# Patient Record
Sex: FEMALE | Race: WHITE | NOT HISPANIC OR LATINO
[De-identification: names, ages, dates, MRNs, and addresses within clinical notes are randomized per-mention and may not be internally consistent; named-entity substitution may affect disease eponyms.]

---

## 2024-01-08 ENCOUNTER — NON-APPOINTMENT (OUTPATIENT)
Age: 79
End: 2024-01-08

## 2024-01-10 PROBLEM — Z00.00 ENCOUNTER FOR PREVENTIVE HEALTH EXAMINATION: Status: ACTIVE | Noted: 2024-01-10

## 2024-01-12 ENCOUNTER — APPOINTMENT (OUTPATIENT)
Dept: ORTHOPEDIC SURGERY | Facility: CLINIC | Age: 79
End: 2024-01-12
Payer: MEDICARE

## 2024-01-12 ENCOUNTER — NON-APPOINTMENT (OUTPATIENT)
Age: 79
End: 2024-01-12

## 2024-01-12 PROCEDURE — 73502 X-RAY EXAM HIP UNI 2-3 VIEWS: CPT

## 2024-01-12 PROCEDURE — 99204 OFFICE O/P NEW MOD 45 MIN: CPT

## 2024-01-12 NOTE — DISCUSSION/SUMMARY
[de-identified] : 78 year old with RIGHT hip osteoarthritis, with debilitating RIGHT hip pain that is unresponsive to comprehensive conservative management and compromising quality of life. The conditions and treatment options have been discussed with the patient. Given Xrays, failure of prior conservative treatment including physical therapy, NSAIDs, cortisone injections, the patient understands that I recommend total hip replacement as their best option for long term pain relief. At this time, the patient is a candidate for surgery based on xrays and quality of life. The patient participated in an interactive discussion of the THR implant planned for their surgery with questions answered, agreed with the treatment plan, and has decided to move forward with elective THR as planned. Implant brand choices and bearing surfaces were offered to the patient. The patient agreed to the plan of care as well as the use of implants for their total hip replacement.  Patient will be scheduled for RIGHT total hip replacement  Patient requires radiation treatment of the hip joint prior to the surgery to prevent heterotopic ossification  Patient will require a rolling walker as this is needed for activities of daily living within their home secondary to the diagnosis of osteoarthritis and post-surgical ambulation. A 3-in-1 commode for bedside use is also required, as the patient has no ability to access the bathroom in their home.  Dr. Wesley Morin MD

## 2024-01-12 NOTE — ADDENDUM
[FreeTextEntry1] : I, Ciera Trotter, acted solely as a scribe for Dr. Wesley Morin MD on this date 01/12/2024 .  All medical record entries made by the Scribe were at my, Dr. Wesley Morin MD , direction and personally dictated by me on 01/12/2024 . I have reviewed the chart and agree that the record accurately reflects my personal performance of the history, physical exam, assessment and plan. I have also personally directed, reviewed, and agreed with the chart.

## 2024-01-12 NOTE — HISTORY OF PRESENT ILLNESS
[de-identified] : KIKA ANTON is a 78 year F  presenting to the office complaining of right hip pain, history of left total hip replacement 10 years ago. Patient presents ambulating independently. Patient reports pain for years intermittently. Patient complains of chronic right buttock pain that occasionally radiates down the right lower extremity. Pain is provoked by weight bearing. She reports a history of intermittent back pain. She notes an occasional "burning sensation" that radiates down her lower extremities. Denies any groin pain. Denies any trauma or injury. She reports a diminished quality of life due to the pain. She takes Aleve for pain with moderate relief. She states her pain wakes her up throughout the night.

## 2024-01-12 NOTE — PHYSICAL EXAM
[de-identified] : Constitutional: Well nourished , well developed and in no acute distress Psychiatric: Alert and oriented to time place and person.Appropriate affect . Skin: Head, neck, arms and lower extremities:no lesions or discoloration HEENT: Normocephalic, EOM intact, Nasal septum midline, Respiratory: Unlabored respirations,no audible wheezing ,no tachypnea, no cyanosis Cardiovascular: No leg swelling no ankle edema no JVD, pulse regular Vascular: No calf or thigh tenderness, Peripheral pulses: intact Lymphatics: No groin adenopathy,no lymphedema lower or upper extremities   o Right Hip Exam: Inspection/Palpation : no tenderness, no swelling, no deformity Leg Lengths: equal Passive Range of Motion: Log Roll (+ provokes typical pain) Strength: resisted hip flexion 5/5, resisted hip abduction 5/5 Motor Strength: Peroneals, EHL, and tibialis anterior 5/5 Reflexes: Patella Absent R=L, Achilles 2+ R=L Skin : no erythema, no ecchymosis Sensation : sensation to light touch intact Vascular Exam : no edema, no cyanosis, dorsalis pedis artery pulse 2+, posterior tibial artery pulse 2+ [de-identified] : __________________________ X-rays of the AP pelvis and AP, lateral of the RIGHT hip obtained today 01/12/2024 demonstrates supralateral joint space narrowing focal bone on bone opposition and left cementless total hip satisfactory total component alignment

## 2024-02-22 ENCOUNTER — OUTPATIENT (OUTPATIENT)
Dept: OUTPATIENT SERVICES | Facility: HOSPITAL | Age: 79
LOS: 1 days | Discharge: HOME | End: 2024-02-22
Payer: MEDICARE

## 2024-02-22 DIAGNOSIS — Z90.49 ACQUIRED ABSENCE OF OTHER SPECIFIED PARTS OF DIGESTIVE TRACT: Chronic | ICD-10-CM

## 2024-02-22 DIAGNOSIS — Z96.642 PRESENCE OF LEFT ARTIFICIAL HIP JOINT: Chronic | ICD-10-CM

## 2024-02-28 ENCOUNTER — OUTPATIENT (OUTPATIENT)
Dept: OUTPATIENT SERVICES | Facility: HOSPITAL | Age: 79
LOS: 1 days | End: 2024-02-28
Payer: MEDICARE

## 2024-02-28 VITALS
DIASTOLIC BLOOD PRESSURE: 88 MMHG | OXYGEN SATURATION: 98 % | RESPIRATION RATE: 18 BRPM | HEIGHT: 61 IN | WEIGHT: 173.94 LBS | HEART RATE: 68 BPM | TEMPERATURE: 98 F | SYSTOLIC BLOOD PRESSURE: 160 MMHG

## 2024-02-28 DIAGNOSIS — Z96.642 PRESENCE OF LEFT ARTIFICIAL HIP JOINT: Chronic | ICD-10-CM

## 2024-02-28 DIAGNOSIS — Z01.818 ENCOUNTER FOR OTHER PREPROCEDURAL EXAMINATION: ICD-10-CM

## 2024-02-28 DIAGNOSIS — M16.11 UNILATERAL PRIMARY OSTEOARTHRITIS, RIGHT HIP: ICD-10-CM

## 2024-02-28 DIAGNOSIS — Z90.49 ACQUIRED ABSENCE OF OTHER SPECIFIED PARTS OF DIGESTIVE TRACT: Chronic | ICD-10-CM

## 2024-02-28 LAB
A1C WITH ESTIMATED AVERAGE GLUCOSE RESULT: 5.4 % — SIGNIFICANT CHANGE UP (ref 4–5.6)
ALBUMIN SERPL ELPH-MCNC: 3.9 G/DL — SIGNIFICANT CHANGE UP (ref 3.3–5)
ALP SERPL-CCNC: 80 U/L — SIGNIFICANT CHANGE UP (ref 30–120)
ALT FLD-CCNC: 15 U/L — SIGNIFICANT CHANGE UP (ref 10–60)
ANION GAP SERPL CALC-SCNC: 7 MMOL/L — SIGNIFICANT CHANGE UP (ref 5–17)
APTT BLD: 38.2 SEC — HIGH (ref 24.5–35.6)
AST SERPL-CCNC: 14 U/L — SIGNIFICANT CHANGE UP (ref 10–40)
BILIRUB SERPL-MCNC: 0.7 MG/DL — SIGNIFICANT CHANGE UP (ref 0.2–1.2)
BLD GP AB SCN SERPL QL: SIGNIFICANT CHANGE UP
BUN SERPL-MCNC: 14 MG/DL — SIGNIFICANT CHANGE UP (ref 7–23)
CALCIUM SERPL-MCNC: 9.5 MG/DL — SIGNIFICANT CHANGE UP (ref 8.4–10.5)
CHLORIDE SERPL-SCNC: 102 MMOL/L — SIGNIFICANT CHANGE UP (ref 96–108)
CO2 SERPL-SCNC: 28 MMOL/L — SIGNIFICANT CHANGE UP (ref 22–31)
CREAT SERPL-MCNC: 0.9 MG/DL — SIGNIFICANT CHANGE UP (ref 0.5–1.3)
EGFR: 66 ML/MIN/1.73M2 — SIGNIFICANT CHANGE UP
ESTIMATED AVERAGE GLUCOSE: 108 MG/DL — SIGNIFICANT CHANGE UP (ref 68–114)
GLUCOSE SERPL-MCNC: 109 MG/DL — HIGH (ref 70–99)
HCT VFR BLD CALC: 46.5 % — HIGH (ref 34.5–45)
HGB BLD-MCNC: 14.8 G/DL — SIGNIFICANT CHANGE UP (ref 11.5–15.5)
INR BLD: 1.11 RATIO — SIGNIFICANT CHANGE UP (ref 0.85–1.18)
MCHC RBC-ENTMCNC: 27.5 PG — SIGNIFICANT CHANGE UP (ref 27–34)
MCHC RBC-ENTMCNC: 31.8 GM/DL — LOW (ref 32–36)
MCV RBC AUTO: 86.3 FL — SIGNIFICANT CHANGE UP (ref 80–100)
MRSA PCR RESULT.: SIGNIFICANT CHANGE UP
NRBC # BLD: 0 /100 WBCS — SIGNIFICANT CHANGE UP (ref 0–0)
PLATELET # BLD AUTO: 208 K/UL — SIGNIFICANT CHANGE UP (ref 150–400)
POTASSIUM SERPL-MCNC: 4.5 MMOL/L — SIGNIFICANT CHANGE UP (ref 3.5–5.3)
POTASSIUM SERPL-SCNC: 4.5 MMOL/L — SIGNIFICANT CHANGE UP (ref 3.5–5.3)
PROT SERPL-MCNC: 7.8 G/DL — SIGNIFICANT CHANGE UP (ref 6–8.3)
PROTHROM AB SERPL-ACNC: 12.4 SEC — SIGNIFICANT CHANGE UP (ref 9.5–13)
RBC # BLD: 5.39 M/UL — HIGH (ref 3.8–5.2)
RBC # FLD: 13.8 % — SIGNIFICANT CHANGE UP (ref 10.3–14.5)
S AUREUS DNA NOSE QL NAA+PROBE: DETECTED
SODIUM SERPL-SCNC: 137 MMOL/L — SIGNIFICANT CHANGE UP (ref 135–145)
WBC # BLD: 5.88 K/UL — SIGNIFICANT CHANGE UP (ref 3.8–10.5)
WBC # FLD AUTO: 5.88 K/UL — SIGNIFICANT CHANGE UP (ref 3.8–10.5)

## 2024-02-28 PROCEDURE — G0463: CPT

## 2024-02-28 NOTE — H&P PST ADULT - HISTORY OF PRESENT ILLNESS
This is a 77 y/o female with h/o A-fib complains progressively worsening right hip pain. Patient states that she has severte pain 7/10 while walking, long standing and while sleeping at night. Failed conservative management. She use cane while walking. She takes ALEVE for pain with some relief. This is a 77 y/o female with h/o A-fib complains progressively worsening right hip pain. Patient states that she has severe pain 7/10 while walking, long standing and sleeping at night. Failed conservative management. She use cane while walking. She takes ALEVE for pain with some relief. Patient is scheduled for Right anterior total hip replacement on 03/19/2024

## 2024-02-28 NOTE — H&P PST ADULT - NEGATIVE ENMT SYMPTOMS
no ear pain/no vertigo/no nasal obstruction/no post-nasal discharge/no nose bleeds/no gum bleeding/no dry mouth

## 2024-02-28 NOTE — H&P PST ADULT - NSICDXPASTMEDICALHX_GEN_ALL_CORE_FT
PAST MEDICAL HISTORY:  Arthritis of right hip     Atrial flutter     HLD (hyperlipidemia)     HTN (hypertension)      PAST MEDICAL HISTORY:  Afib     Arthritis of right hip     HLD (hyperlipidemia)     HTN (hypertension)

## 2024-02-28 NOTE — H&P PST ADULT - PROBLEM SELECTOR PLAN 1
scheduled for Right anterior total hip replacement  pre-op instructions provided  Will obtain medical and cardiology clearance  Educated on  NSAID, herbals, vitamins that increase the risk for bleeding and need to be stopped

## 2024-02-29 RX ORDER — MUPIROCIN 20 MG/G
1 OINTMENT TOPICAL
Qty: 1 | Refills: 0
Start: 2024-02-29 | End: 2024-03-04

## 2024-03-07 PROBLEM — M16.11 UNILATERAL PRIMARY OSTEOARTHRITIS, RIGHT HIP: Chronic | Status: ACTIVE | Noted: 2024-02-28

## 2024-03-07 PROBLEM — I48.91 UNSPECIFIED ATRIAL FIBRILLATION: Chronic | Status: ACTIVE | Noted: 2024-02-28

## 2024-03-20 ENCOUNTER — APPOINTMENT (OUTPATIENT)
Dept: ORTHOPEDIC SURGERY | Facility: CLINIC | Age: 79
End: 2024-03-20
Payer: MEDICARE

## 2024-03-20 VITALS — BODY MASS INDEX: 32.2 KG/M2 | HEIGHT: 62 IN | WEIGHT: 175 LBS

## 2024-03-20 PROCEDURE — 99213 OFFICE O/P EST LOW 20 MIN: CPT

## 2024-03-20 PROCEDURE — 72170 X-RAY EXAM OF PELVIS: CPT

## 2024-03-20 NOTE — PHYSICAL EXAM
[de-identified] : Constitutional: Well nourished , well developed and in no acute distress Psychiatric: Alert and oriented to time place and person.Appropriate affect . Skin: Head, neck, arms and lower extremities:no lesions or discoloration HEENT: Normocephalic, EOM intact, Nasal septum midline, Respiratory: Unlabored respirations,no audible wheezing ,no tachypnea, no cyanosis Cardiovascular: No leg swelling no ankle edema no JVD, pulse regular Vascular: No calf or thigh tenderness, Peripheral pulses: intact Lymphatics: No groin adenopathy,no lymphedema lower or upper extremities   o Right Hip Exam: Inspection/Palpation : no tenderness, no swelling, no deformity, skin in tact. Leg Lengths: equal Passive Range of Motion: Log Roll (+ provokes typical pain) Strength: resisted hip flexion 5/5, resisted hip abduction 5/5 Motor Strength: Peroneals, EHL, and tibialis anterior 5/5 Reflexes: Patella Absent R=L, Achilles 2+ R=L Skin : no erythema, no ecchymosis Sensation : sensation to light touch intact Vascular Exam : no edema, no cyanosis, dorsalis pedis artery pulse 2+, posterior tibial artery pulse 2+ [de-identified] : o X-rays of the AP pelvis and AP, lateral of the RIGHT hip obtained 03/20/2024 demonstrates supralateral joint space narrowing bone on bone opposition. __________________________ X-rays of the AP pelvis and AP, lateral of the RIGHT hip obtained 01/12/2024 demonstrates supralateral joint space narrowing focal bone on bone opposition and left cementless total hip satisfactory total component alignment

## 2024-03-20 NOTE — DISCUSSION/SUMMARY
[de-identified] : 78 year old with RIGHT hip osteoarthritis, with debilitating RIGHT hip pain that is unresponsive to comprehensive conservative management and compromising quality of life. The conditions and treatment options have been discussed with the patient. Given Xrays, failure of prior conservative treatment including physical therapy, NSAIDs, cortisone injections, the patient understands that I recommend total hip replacement as their best option for long term pain relief. At this time, the patient is a candidate for surgery based on xrays and quality of life. The patient participated in an interactive discussion of the THR implant planned for their surgery with questions answered, agreed with the treatment plan, and has decided to move forward with elective THR as planned. Implant brand choices and bearing surfaces were offered to the patient. The patient agreed to the plan of care as well as the use of implants for their total hip replacement.  Patient will be scheduled for RIGHT total hip replacement  Patient requires radiation treatment of the hip joint prior to the surgery to prevent heterotopic ossification  Patient will require a rolling walker as this is needed for activities of daily living within their home secondary to the diagnosis of osteoarthritis and post-surgical ambulation. A 3-in-1 commode for bedside use is also required, as the patient has no ability to access the bathroom in their home.  Dr. Wesley Morin MD

## 2024-03-20 NOTE — HISTORY OF PRESENT ILLNESS
[de-identified] : KIKA ANTON is a 78 year F  presenting to the office complaining of right hip pain, history of left total hip replacement 10 years ago. Patient presents ambulating independently. Patient reports pain for years intermittently. Patient complains of chronic right buttock pain that occasionally radiates down the right lower extremity. Pain is provoked by weight bearing. She reports a history of intermittent back pain. She notes an occasional "burning sensation" that radiates down her lower extremities. Denies any groin pain. Denies any trauma or injury. She reports a diminished quality of life due to the pain. She takes Aleve for pain with moderate relief. She states her pain wakes her up throughout the night.  She presents today for a pre-operative skin check.

## 2024-03-20 NOTE — ADDENDUM
[FreeTextEntry1] : I, Ciera Trotter, acted solely as a scribe for Dr. Hunter Hewitt MD on this date 03/26/2024   All medical record entries made by the Scribe were at my, Dr. Hunter Hewitt MD, direction and personally dictated by me on 03/26/2024 .  I have reviewed the chart and agree that the record accurately reflects my personal performance of the history, physical exam, assessment and plan. I have also personally directed, reviewed, and agreed with the chart.

## 2024-03-21 ENCOUNTER — APPOINTMENT (OUTPATIENT)
Dept: RADIATION ONCOLOGY | Facility: CLINIC | Age: 79
End: 2024-03-21
Payer: MEDICARE

## 2024-03-21 DIAGNOSIS — I10 ESSENTIAL (PRIMARY) HYPERTENSION: ICD-10-CM

## 2024-03-21 DIAGNOSIS — Z86.79 PERSONAL HISTORY OF OTHER DISEASES OF THE CIRCULATORY SYSTEM: ICD-10-CM

## 2024-03-21 DIAGNOSIS — E78.5 HYPERLIPIDEMIA, UNSPECIFIED: ICD-10-CM

## 2024-03-21 DIAGNOSIS — M16.11 UNILATERAL PRIMARY OSTEOARTHRITIS, RIGHT HIP: ICD-10-CM

## 2024-03-21 PROCEDURE — 99202 OFFICE O/P NEW SF 15 MIN: CPT

## 2024-03-21 RX ORDER — METOPROLOL TARTRATE 100 MG/1
100 TABLET, FILM COATED ORAL DAILY
Refills: 0 | Status: ACTIVE | COMMUNITY
Start: 2024-03-21

## 2024-03-21 RX ORDER — ASPIRIN ENTERIC COATED TABLETS 81 MG 81 MG/1
81 TABLET, DELAYED RELEASE ORAL DAILY
Refills: 0 | Status: ACTIVE | COMMUNITY
Start: 2024-03-21

## 2024-03-21 RX ORDER — SIMVASTATIN 20 MG/1
20 TABLET, FILM COATED ORAL DAILY
Refills: 0 | Status: ACTIVE | COMMUNITY
Start: 2024-03-21

## 2024-03-21 NOTE — VITALS
[Least Pain Intensity: 6/10] : 6/10 [Maximal Pain Intensity: 8/10] : 8/10 [60: Requires occasional assistance, but is able to care for most of his/her needs] : 60: Requires occasional assistance, but is able to care for most of his/her needs

## 2024-03-21 NOTE — VITALS
[Maximal Pain Intensity: 8/10] : 8/10 [Least Pain Intensity: 6/10] : 6/10 [60: Requires occasional assistance, but is able to care for most of his/her needs] : 60: Requires occasional assistance, but is able to care for most of his/her needs

## 2024-03-22 RX ORDER — SENNA PLUS 8.6 MG/1
2 TABLET ORAL AT BEDTIME
Refills: 0 | Status: DISCONTINUED | OUTPATIENT
Start: 2024-03-26 | End: 2024-03-28

## 2024-03-22 RX ORDER — SODIUM CHLORIDE 9 MG/ML
1000 INJECTION, SOLUTION INTRAVENOUS
Refills: 0 | Status: DISCONTINUED | OUTPATIENT
Start: 2024-03-26 | End: 2024-03-28

## 2024-03-22 RX ORDER — APREPITANT 80 MG/1
40 CAPSULE ORAL ONCE
Refills: 0 | Status: COMPLETED | OUTPATIENT
Start: 2024-03-26 | End: 2024-03-26

## 2024-03-22 RX ORDER — POLYETHYLENE GLYCOL 3350 17 G/17G
17 POWDER, FOR SOLUTION ORAL AT BEDTIME
Refills: 0 | Status: DISCONTINUED | OUTPATIENT
Start: 2024-03-26 | End: 2024-03-28

## 2024-03-22 RX ORDER — MAGNESIUM HYDROXIDE 400 MG/1
30 TABLET, CHEWABLE ORAL DAILY
Refills: 0 | Status: DISCONTINUED | OUTPATIENT
Start: 2024-03-26 | End: 2024-03-28

## 2024-03-22 RX ORDER — CHLORHEXIDINE GLUCONATE 213 G/1000ML
1 SOLUTION TOPICAL ONCE
Refills: 0 | Status: COMPLETED | OUTPATIENT
Start: 2024-03-26 | End: 2024-03-26

## 2024-03-22 RX ORDER — HYDROMORPHONE HYDROCHLORIDE 2 MG/ML
0.5 INJECTION INTRAMUSCULAR; INTRAVENOUS; SUBCUTANEOUS
Refills: 0 | Status: DISCONTINUED | OUTPATIENT
Start: 2024-03-26 | End: 2024-03-28

## 2024-03-22 RX ORDER — ACETAMINOPHEN 500 MG
1000 TABLET ORAL ONCE
Refills: 0 | Status: COMPLETED | OUTPATIENT
Start: 2024-03-26 | End: 2024-03-26

## 2024-03-22 RX ORDER — TRANEXAMIC ACID 100 MG/ML
1000 INJECTION, SOLUTION INTRAVENOUS ONCE
Refills: 0 | Status: COMPLETED | OUTPATIENT
Start: 2024-03-26 | End: 2024-03-26

## 2024-03-22 RX ORDER — CEFAZOLIN SODIUM 1 G
2000 VIAL (EA) INJECTION ONCE
Refills: 0 | Status: COMPLETED | OUTPATIENT
Start: 2024-03-26 | End: 2024-03-26

## 2024-03-22 RX ORDER — ACETAMINOPHEN 500 MG
1000 TABLET ORAL EVERY 8 HOURS
Refills: 0 | Status: DISCONTINUED | OUTPATIENT
Start: 2024-03-27 | End: 2024-03-28

## 2024-03-22 RX ORDER — ONDANSETRON 8 MG/1
4 TABLET, FILM COATED ORAL EVERY 6 HOURS
Refills: 0 | Status: DISCONTINUED | OUTPATIENT
Start: 2024-03-26 | End: 2024-03-28

## 2024-03-22 RX ORDER — PANTOPRAZOLE SODIUM 20 MG/1
40 TABLET, DELAYED RELEASE ORAL
Refills: 0 | Status: DISCONTINUED | OUTPATIENT
Start: 2024-03-26 | End: 2024-03-28

## 2024-03-25 PROCEDURE — 77280 THER RAD SIMULAJ FIELD SMPL: CPT | Mod: 26

## 2024-03-25 PROCEDURE — 77261 THER RADIOLOGY TX PLNG SMPL: CPT

## 2024-03-25 PROCEDURE — 77431 RADIATION THERAPY MANAGEMENT: CPT

## 2024-03-25 PROCEDURE — 77300 RADIATION THERAPY DOSE PLAN: CPT | Mod: 26

## 2024-03-26 ENCOUNTER — INPATIENT (INPATIENT)
Facility: HOSPITAL | Age: 79
LOS: 1 days | Discharge: HOME HEALTH | DRG: 470 | End: 2024-03-28
Attending: ORTHOPAEDIC SURGERY | Admitting: ORTHOPAEDIC SURGERY
Payer: MEDICARE

## 2024-03-26 ENCOUNTER — APPOINTMENT (OUTPATIENT)
Dept: ORTHOPEDIC SURGERY | Facility: HOSPITAL | Age: 79
End: 2024-03-26

## 2024-03-26 ENCOUNTER — TRANSCRIPTION ENCOUNTER (OUTPATIENT)
Age: 79
End: 2024-03-26

## 2024-03-26 VITALS
HEIGHT: 61 IN | DIASTOLIC BLOOD PRESSURE: 75 MMHG | RESPIRATION RATE: 20 BRPM | SYSTOLIC BLOOD PRESSURE: 156 MMHG | HEART RATE: 69 BPM | TEMPERATURE: 98 F | WEIGHT: 170.42 LBS | OXYGEN SATURATION: 97 %

## 2024-03-26 DIAGNOSIS — M16.11 UNILATERAL PRIMARY OSTEOARTHRITIS, RIGHT HIP: ICD-10-CM

## 2024-03-26 DIAGNOSIS — Z96.642 PRESENCE OF LEFT ARTIFICIAL HIP JOINT: Chronic | ICD-10-CM

## 2024-03-26 DIAGNOSIS — Z90.49 ACQUIRED ABSENCE OF OTHER SPECIFIED PARTS OF DIGESTIVE TRACT: Chronic | ICD-10-CM

## 2024-03-26 DEVICE — HEAD FEM CEM TAPR PLUS 1.5MM 12/14X36MM: Type: IMPLANTABLE DEVICE | Site: RIGHT | Status: FUNCTIONAL

## 2024-03-26 DEVICE — HEAD FEM CERAMIC 12/14 36MM PLUS 5: Type: IMPLANTABLE DEVICE | Site: RIGHT | Status: FUNCTIONAL

## 2024-03-26 DEVICE — LIGATING CLIPS WECK HORIZON MEDIUM (BLUE) 6: Type: IMPLANTABLE DEVICE | Site: RIGHT | Status: FUNCTIONAL

## 2024-03-26 DEVICE — SCREW ACET CANC PINN 6.5X25MM: Type: IMPLANTABLE DEVICE | Site: RIGHT | Status: FUNCTIONAL

## 2024-03-26 DEVICE — BONE WAX 2.5GM: Type: IMPLANTABLE DEVICE | Site: RIGHT | Status: FUNCTIONAL

## 2024-03-26 DEVICE — LINER PINNACLE ALTRX 36X54MM: Type: IMPLANTABLE DEVICE | Site: RIGHT | Status: FUNCTIONAL

## 2024-03-26 DEVICE — LIGATING CLIPS WECK HORIZON MEDIUM-LARGE (GREEN) 6: Type: IMPLANTABLE DEVICE | Site: RIGHT | Status: FUNCTIONAL

## 2024-03-26 DEVICE — STEM FEM COLLAR CORAIL SZ KA12: Type: IMPLANTABLE DEVICE | Site: RIGHT | Status: FUNCTIONAL

## 2024-03-26 DEVICE — LIGATING CLIPS WECK HORIZON LARGE (ORANGE) 6: Type: IMPLANTABLE DEVICE | Site: RIGHT | Status: FUNCTIONAL

## 2024-03-26 DEVICE — SHELL ACET PINNACLE SECTOR W/ GRIPTION 54MM: Type: IMPLANTABLE DEVICE | Site: RIGHT | Status: FUNCTIONAL

## 2024-03-26 DEVICE — SCREW ACET CANC PINN 6.5X20MM: Type: IMPLANTABLE DEVICE | Site: RIGHT | Status: FUNCTIONAL

## 2024-03-26 DEVICE — CABLE CABLE-RDY PLT TROCH 1.8X635: Type: IMPLANTABLE DEVICE | Site: RIGHT | Status: FUNCTIONAL

## 2024-03-26 RX ORDER — SENNA PLUS 8.6 MG/1
2 TABLET ORAL
Qty: 0 | Refills: 0 | DISCHARGE
Start: 2024-03-26

## 2024-03-26 RX ORDER — CEFAZOLIN SODIUM 1 G
2000 VIAL (EA) INJECTION EVERY 8 HOURS
Refills: 0 | Status: COMPLETED | OUTPATIENT
Start: 2024-03-26 | End: 2024-03-27

## 2024-03-26 RX ORDER — OXYCODONE HYDROCHLORIDE 5 MG/1
10 TABLET ORAL
Refills: 0 | Status: DISCONTINUED | OUTPATIENT
Start: 2024-03-26 | End: 2024-03-27

## 2024-03-26 RX ORDER — MELOXICAM 15 MG/1
1 TABLET ORAL
Qty: 30 | Refills: 0
Start: 2024-03-26 | End: 2024-04-24

## 2024-03-26 RX ORDER — MELOXICAM 15 MG/1
15 TABLET ORAL ONCE
Refills: 0 | Status: DISCONTINUED | OUTPATIENT
Start: 2024-03-26 | End: 2024-03-28

## 2024-03-26 RX ORDER — SODIUM CHLORIDE 9 MG/ML
1000 INJECTION, SOLUTION INTRAVENOUS
Refills: 0 | Status: DISCONTINUED | OUTPATIENT
Start: 2024-03-26 | End: 2024-03-26

## 2024-03-26 RX ORDER — ONDANSETRON 8 MG/1
4 TABLET, FILM COATED ORAL ONCE
Refills: 0 | Status: DISCONTINUED | OUTPATIENT
Start: 2024-03-26 | End: 2024-03-26

## 2024-03-26 RX ORDER — METOPROLOL TARTRATE 50 MG
1 TABLET ORAL
Refills: 0 | DISCHARGE

## 2024-03-26 RX ORDER — SIMVASTATIN 20 MG/1
1 TABLET, FILM COATED ORAL
Refills: 0 | DISCHARGE

## 2024-03-26 RX ORDER — SIMVASTATIN 20 MG/1
10 TABLET, FILM COATED ORAL AT BEDTIME
Refills: 0 | Status: DISCONTINUED | OUTPATIENT
Start: 2024-03-26 | End: 2024-03-28

## 2024-03-26 RX ORDER — ASPIRIN/CALCIUM CARB/MAGNESIUM 324 MG
81 TABLET ORAL EVERY 12 HOURS
Refills: 0 | Status: DISCONTINUED | OUTPATIENT
Start: 2024-03-27 | End: 2024-03-28

## 2024-03-26 RX ORDER — SODIUM CHLORIDE 9 MG/ML
500 INJECTION INTRAMUSCULAR; INTRAVENOUS; SUBCUTANEOUS ONCE
Refills: 0 | Status: COMPLETED | OUTPATIENT
Start: 2024-03-26 | End: 2024-03-26

## 2024-03-26 RX ORDER — ASPIRIN/CALCIUM CARB/MAGNESIUM 324 MG
0 TABLET ORAL
Qty: 0 | Refills: 0 | DISCHARGE

## 2024-03-26 RX ORDER — ACETAMINOPHEN 500 MG
1000 TABLET ORAL ONCE
Refills: 0 | Status: COMPLETED | OUTPATIENT
Start: 2024-03-26 | End: 2024-03-26

## 2024-03-26 RX ORDER — DEXAMETHASONE 0.5 MG/5ML
8 ELIXIR ORAL ONCE
Refills: 0 | Status: COMPLETED | OUTPATIENT
Start: 2024-03-27 | End: 2024-03-27

## 2024-03-26 RX ORDER — ACETAMINOPHEN 500 MG
2 TABLET ORAL
Qty: 0 | Refills: 0 | DISCHARGE
Start: 2024-03-26

## 2024-03-26 RX ORDER — OXYCODONE HYDROCHLORIDE 5 MG/1
5 TABLET ORAL
Refills: 0 | Status: DISCONTINUED | OUTPATIENT
Start: 2024-03-26 | End: 2024-03-27

## 2024-03-26 RX ORDER — METOPROLOL TARTRATE 50 MG
100 TABLET ORAL DAILY
Refills: 0 | Status: DISCONTINUED | OUTPATIENT
Start: 2024-03-26 | End: 2024-03-28

## 2024-03-26 RX ORDER — HYDROMORPHONE HYDROCHLORIDE 2 MG/ML
0.2 INJECTION INTRAMUSCULAR; INTRAVENOUS; SUBCUTANEOUS
Refills: 0 | Status: DISCONTINUED | OUTPATIENT
Start: 2024-03-26 | End: 2024-03-26

## 2024-03-26 RX ORDER — HYDROMORPHONE HYDROCHLORIDE 2 MG/ML
0.5 INJECTION INTRAMUSCULAR; INTRAVENOUS; SUBCUTANEOUS
Refills: 0 | Status: DISCONTINUED | OUTPATIENT
Start: 2024-03-26 | End: 2024-03-26

## 2024-03-26 RX ORDER — POLYETHYLENE GLYCOL 3350 17 G/17G
17 POWDER, FOR SOLUTION ORAL
Qty: 0 | Refills: 0 | DISCHARGE
Start: 2024-03-26

## 2024-03-26 RX ORDER — MELOXICAM 15 MG/1
15 TABLET ORAL DAILY
Refills: 0 | Status: DISCONTINUED | OUTPATIENT
Start: 2024-03-27 | End: 2024-03-28

## 2024-03-26 RX ORDER — ASPIRIN/CALCIUM CARB/MAGNESIUM 324 MG
1 TABLET ORAL
Qty: 56 | Refills: 0
Start: 2024-03-26 | End: 2024-04-22

## 2024-03-26 RX ADMIN — Medication 1000 MILLIGRAM(S): at 21:19

## 2024-03-26 RX ADMIN — SODIUM CHLORIDE 75 MILLILITER(S): 9 INJECTION, SOLUTION INTRAVENOUS at 16:57

## 2024-03-26 RX ADMIN — SENNA PLUS 2 TABLET(S): 8.6 TABLET ORAL at 21:16

## 2024-03-26 RX ADMIN — Medication 400 MILLIGRAM(S): at 21:15

## 2024-03-26 RX ADMIN — SODIUM CHLORIDE 500 MILLILITER(S): 9 INJECTION INTRAMUSCULAR; INTRAVENOUS; SUBCUTANEOUS at 21:18

## 2024-03-26 RX ADMIN — SIMVASTATIN 10 MILLIGRAM(S): 20 TABLET, FILM COATED ORAL at 21:16

## 2024-03-26 RX ADMIN — SODIUM CHLORIDE 500 MILLILITER(S): 9 INJECTION INTRAMUSCULAR; INTRAVENOUS; SUBCUTANEOUS at 16:57

## 2024-03-26 RX ADMIN — APREPITANT 40 MILLIGRAM(S): 80 CAPSULE ORAL at 09:00

## 2024-03-26 RX ADMIN — Medication 100 MILLIGRAM(S): at 20:00

## 2024-03-26 RX ADMIN — CHLORHEXIDINE GLUCONATE 1 APPLICATION(S): 213 SOLUTION TOPICAL at 09:00

## 2024-03-26 NOTE — ASU PREOP CHECKLIST - TYPE OF SOLUTION
Detail Level: Simple LR Hide Additional Notes?: No Additional Notes (Optional): Reassured benign in nature

## 2024-03-26 NOTE — DISCHARGE NOTE PROVIDER - HOSPITAL COURSE
The patient is a 78 y.o. female with severe osteoarthritis of the right hip.  She was evaluated by the PST dept at [facility] and obtained the appropriate medical clearances.  On the day prior to surgery, she received preop radiation for heterotopic ossification prophylaxis.  After admission on 3/26/24 and receiving pre-operative parenteral prophylactic antibiotics (ancef), the patient  underwent an  uncomplicated right anterior ALONSO by orthopedic surgeon Raeann Morin.    A medical consultation from the Hospitalist service was obtained for post-operative medical co-management. Typical Physical & occupational therapy modalities post anterior ALONSO were performed including ambulation training, range of motion, ADL's, and transfers. Ecotrin 81 mg every 12 hrs, along w/ bilat venodynes, was given for DVT prophylaxis (caprini 9).  The patient had a clean appearing surgical incision with no sign of surgical site infections and had a stable neuro / vascular exam of the operated extremity.  After progression of mobility guided by the PT/ OT staff,  the patient was felt to benefit from further rehabilitative care for restoration to level of function. This was felt to best be accomplished at  home w/ home care (skilled RN/PT/OT).  Discharge and Orthopedic Care instructions were delineated in the Discharge Plan and reviewed with the patient. All medications were delineated in the medication reconciliation tool and key points were reviewed with the patient. They were deemed stable from an Orthopedic & medical standpoint for discharge today.  Upon completion of Home care she will be  following up with Dr. Morin for office  follow up Orthopedic care.

## 2024-03-26 NOTE — DISCHARGE NOTE PROVIDER - CARE PROVIDER_API CALL
Kamron Morin  Orthopaedic Surgery  [address], [address]  [place] [address]  Phone: [phone]  Fax: [phone]  Established Patient  Scheduled Appointment: 04/10/2024 01:45 PM

## 2024-03-26 NOTE — DISCHARGE NOTE PROVIDER - NSDCCPCAREPLAN_GEN_ALL_CORE_FT
PRINCIPAL DISCHARGE DIAGNOSIS  Diagnosis: Primary osteoarthritis of right hip  Assessment and Plan of Treatment: right anterior ALONSO  Physical Therapy/Occupational Therapy for: Ambulation, transfers, stairs, & ADLs, isometrics.   Full weight bearing on both legs; Walker use only until cleared by Dr Morin to advance as instructed by Physical therapy/Occupational therapy. Anterior Total Hip Replacement precautions for  6 weeks: No straight leg raise; No external rotation of hip when extended-standing or lying flat; No hyperextension of hip when standing (kickback).   Ice packs to hip for 30 min. every 3 hours and after physical therapy.   Keep incision clean and dry. You have a silverlon dressing. It is water resistant and may get slightly wet in the shower.  Remove dressing in 7 days and leave wound open to air.  Wound may get wet in the shower as long as there is no drainage from wound.  Suture removal on postop day 12-14 by home care nurse  • Do not take a tub bath yet.   • Do not resume driving until you have your surgeon’s permission.  Opioid safety : Do not keep opioid in the medicine cabinet in bathroom or on kitchen counter where others may have access to it.  Do not drive while taking opioid.  To dispose of it some pharmacies do have drop boxes as do police stations.  Do not flush or put in trash.

## 2024-03-26 NOTE — DISCHARGE NOTE PROVIDER - NSDCCPTREATMENT_GEN_ALL_CORE_FT
PRINCIPAL PROCEDURE  Procedure: Arthroplasty of right hip by anterior approach  Findings and Treatment: osteoarthritis right hip

## 2024-03-26 NOTE — PHYSICAL THERAPY INITIAL EVALUATION ADULT - PERTINENT HX OF CURRENT PROBLEM, REHAB EVAL
Pt is a 77 y/o female with right hip primary OA. Pt is s/p right total hip replacement anterior approach on 3/26/24.

## 2024-03-26 NOTE — BRIEF OPERATIVE NOTE - COMMENTS
third assistant--TERRENCE Baez (hana table)  implants: acet 54 mm pinnacle gription sector cup w/ 2 screws and 0 degree altrx liner, femur #12 standard offset collared corail stem w/ 36+5 biolox head	 third assistant--TERRENCE Baez (hana table)  implants: acet 54 mm pinnacle gription sector cup w/ 2 screws and 0 degree altrx liner, femur #12 standard offset collared corail stem w/ 36+5 biolox head	  prophylactic cabling of femur

## 2024-03-26 NOTE — DISCHARGE NOTE PROVIDER - NSDCMRMEDTOKEN_GEN_ALL_CORE_FT
aspirin 81 mg oral tablet: orally  Metoprolol Succinate  mg oral tablet, extended release: 1 tab(s) orally once a day  pantoprazole 40 mg oral delayed release tablet: 1 tab(s) orally once a day as needed for GERD  simvastatin 10 mg oral tablet: 1 tab(s) orally once a day   acetaminophen 500 mg oral tablet: 2 tab(s) orally every 8 hours  aspirin 81 mg oral delayed release tablet: 1 tab(s) orally every 12 hours take 2 hrs before meloxicam  aspirin 81 mg oral tablet: orally resume AFTER twice daily dosing is completed  meloxicam 15 mg oral tablet: 1 tab(s) orally once a day take 2 hrs after ecotrin  Metoprolol Succinate  mg oral tablet, extended release: 1 tab(s) orally once a day  oxyCODONE 5 mg oral tablet: 1 tab(s) orally every 4 hours as needed for  moderate pain or 2 tabs every 4 hrs as needed for severe pain   [facility] [phone] MDD: 6  pantoprazole 40 mg oral delayed release tablet: 1 tab(s) orally once a day before breakfast  polyethylene glycol 3350 oral powder for reconstitution: 17 gram(s) orally once a day (at bedtime)  senna leaf extract oral tablet: 2 tab(s) orally once a day (at bedtime)  simvastatin 10 mg oral tablet: 1 tab(s) orally once a day   acetaminophen 500 mg oral tablet: 2 tab(s) orally every 8 hours  aspirin 81 mg oral delayed release tablet: 1 tab(s) orally every 12 hours take 2 hrs before meloxicam  aspirin 81 mg oral tablet: orally resume AFTER twice daily dosing is completed  meloxicam 15 mg oral tablet: 1 tab(s) orally once a day take 2 hrs after ecotrin  Metoprolol Succinate  mg oral tablet, extended release: 1 tab(s) orally once a day  pantoprazole 40 mg oral delayed release tablet: 1 tab(s) orally once a day before breakfast  polyethylene glycol 3350 oral powder for reconstitution: 17 gram(s) orally once a day (at bedtime)  senna leaf extract oral tablet: 2 tab(s) orally once a day (at bedtime)  simvastatin 10 mg oral tablet: 1 tab(s) orally once a day  traMADol 50 mg oral tablet: 1 tab(s) orally every 4 hours as needed for  moderate pain MDD: 6

## 2024-03-26 NOTE — DISCHARGE NOTE PROVIDER - NSDCFUSCHEDAPPT_GEN_ALL_CORE_FT
Kamron Morin  [facility] PreAdmits.  Scheduled Appointment: 03/26/2024    Kamron Morin  [facility]  ORTHOSUR[address]  Scheduled Appointment: 04/10/2024     [facility]  ORTHOSURG [address]  Scheduled Appointment: 04/10/2024

## 2024-03-26 NOTE — DISCHARGE NOTE PROVIDER - INSTRUCTIONS
For Constipation :   • Increase your water intake. Drink at least 8 glasses of water daily.  • Try adding fiber to your diet by eating fruits, vegetables and foods that are rich in grains.  • If you do experience constipation, you may take an over-the-counter stool softener/laxative such as Erika Colace, Senekot, miralax or  Milk of Magnesia.

## 2024-03-26 NOTE — PHYSICAL THERAPY INITIAL EVALUATION ADULT - ADDITIONAL COMMENTS
Pt lives with  and family in a private house, there are 4 stairs +HR to enter and 13 stairs +HR to the primary bedroom/bathroom. Pt has a tub shower. PTA Pt was independent with ADLs and ambulation with straight cane. Pt reports she utilized the straight cane due to increased right hip pain and weakness. Pt owns a RW and cane.

## 2024-03-26 NOTE — CONSULT NOTE ADULT - ASSESSMENT
77 yo f pmh htn, hld is s/p R THR    #S/P R THR  Post op orders per ortho  Pain management  Bowl regimen  Pt eval  Obtain baseline labs    #HTN  Continue home bp meds with hold parameters  Resume diuretics when discharged home. \par     #HLD  Continue statins    #DVT proph  per ortho

## 2024-03-26 NOTE — PHYSICAL THERAPY INITIAL EVALUATION ADULT - LEVEL OF INDEPENDENCE: GAIT, REHAB EVAL
due to significant b/l knee buckling upon standing and unable to weight shift in order to advance LE/unable to perform

## 2024-03-26 NOTE — CONSULT NOTE ADULT - SUBJECTIVE AND OBJECTIVE BOX
Patient is a 78y old  Female who presents with a chief complaint of right hip pain--for right ant ALONSO (26 Mar 2024 11:05)    HPI:    77 yo f pmh htn, hld is s/p R THR.  Prior to the procedure, patient was having severe pain in that affected joint, which had failed conservative treatment and was recommended surgery.  Pt is currently looking forward to recovery.    REVIEW OF SYSTEMS:  CONSTITUTIONAL: No fever, weight loss, or fatigue  RESPIRATORY: No cough, wheezing, chills or hemoptysis; No shortness of breath  CARDIOVASCULAR: No chest pain, palpitations, dizziness, or leg swelling  GASTROINTESTINAL: No abdominal or epigastric pain. No nausea, vomiting, or hematemesis; No diarrhea or constipation. No melena or hematochezia.  GENITOURINARY: No dysuria, frequency, hematuria, or incontinence  NEUROLOGICAL: No headaches, memory loss, loss of strength, numbness, or tremors  MUSCULOSKELETAL: No muscle or back pain  PSYCHIATRIC: No depression, anxiety, mood swings, or difficulty sleeping      PAST MEDICAL & SURGICAL HISTORY:  HTN (hypertension)      HLD (hyperlipidemia)      Arthritis of right hip      Afib      History of left hip replacement  2014      History of cholecystectomy          SOCIAL HISTORY:  Deniest Tobacco  Denies Etoh  Denies Drugs      Allergies    sulfa drugs (Hives)    Intolerances        MEDICATIONS  (STANDING):    MEDICATIONS  (PRN):      FAMILY HISTORY:  No pertinent family history in first degree relatives        Vital Signs Last 24 Hrs  T(C): 36.5 (26 Mar 2024 08:37), Max: 36.5 (26 Mar 2024 08:37)  T(F): 97.7 (26 Mar 2024 08:37), Max: 97.7 (26 Mar 2024 08:37)  HR: 69 (26 Mar 2024 08:37) (69 - 69)  BP: 156/75 (26 Mar 2024 08:37) (156/75 - 156/75)  BP(mean): --  RR: 20 (26 Mar 2024 08:37) (20 - 20)  SpO2: 97% (26 Mar 2024 08:37) (97% - 97%)        PHYSICAL EXAM:    GENERAL: NAD, well-groomed, well-developed  HEAD:  Atraumatic, Normocephalic  EYES: EOMI, PERRLA, conjunctiva and sclera clear  ENMT: No tonsillar erythema, exudates, or enlargement; Moist mucous membranes, Good dentition, No lesions  NECK: Supple, No JVD, Normal thyroid  NERVOUS SYSTEM:  Alert & Oriented X3, Good concentration; Moving all 4 extremities; No gross sensory deficits  CHEST/LUNG: Clear to auscultation bilaterally; No rales, rhonchi, wheezing, or rubs  HEART: Regular rate and rhythm; No murmurs, rubs, or gallops  ABDOMEN: Soft, Nontender, Nondistended; Bowel sounds present  EXTREMITIES:  2+ Peripheral Pulses, No clubbing, cyanosis, or edema  SKIN: No rashes or lesions  INCISION: intact    Labs: Pending

## 2024-03-26 NOTE — DISCHARGE NOTE PROVIDER - PROVIDER TOKENS
PROVIDER:[TOKEN:[[id number]],SCHEDULEDAPPT:[04/10/2024],SCHEDULEDAPPTTIME:[01:45 PM],ESTABLISHEDPATIENT:[T]]

## 2024-03-26 NOTE — ASU PATIENT PROFILE, ADULT - NSICDXPASTMEDICALHX_GEN_ALL_CORE_FT
PAST MEDICAL HISTORY:  Afib     Arthritis of right hip     HLD (hyperlipidemia)     HTN (hypertension)

## 2024-03-27 ENCOUNTER — TRANSCRIPTION ENCOUNTER (OUTPATIENT)
Age: 79
End: 2024-03-27

## 2024-03-27 LAB
ANION GAP SERPL CALC-SCNC: 9 MMOL/L — SIGNIFICANT CHANGE UP (ref 5–17)
BUN SERPL-MCNC: 10 MG/DL — SIGNIFICANT CHANGE UP (ref 7–23)
CALCIUM SERPL-MCNC: 8.6 MG/DL — SIGNIFICANT CHANGE UP (ref 8.4–10.5)
CHLORIDE SERPL-SCNC: 103 MMOL/L — SIGNIFICANT CHANGE UP (ref 96–108)
CO2 SERPL-SCNC: 26 MMOL/L — SIGNIFICANT CHANGE UP (ref 22–31)
CREAT SERPL-MCNC: 0.65 MG/DL — SIGNIFICANT CHANGE UP (ref 0.5–1.3)
EGFR: 91 ML/MIN/1.73M2 — SIGNIFICANT CHANGE UP
GLUCOSE SERPL-MCNC: 153 MG/DL — HIGH (ref 70–99)
HCT VFR BLD CALC: 34.2 % — LOW (ref 34.5–45)
HGB BLD-MCNC: 11.2 G/DL — LOW (ref 11.5–15.5)
MCHC RBC-ENTMCNC: 28.1 PG — SIGNIFICANT CHANGE UP (ref 27–34)
MCHC RBC-ENTMCNC: 32.7 GM/DL — SIGNIFICANT CHANGE UP (ref 32–36)
MCV RBC AUTO: 85.7 FL — SIGNIFICANT CHANGE UP (ref 80–100)
NRBC # BLD: 0 /100 WBCS — SIGNIFICANT CHANGE UP (ref 0–0)
PLATELET # BLD AUTO: 186 K/UL — SIGNIFICANT CHANGE UP (ref 150–400)
POTASSIUM SERPL-MCNC: 4.1 MMOL/L — SIGNIFICANT CHANGE UP (ref 3.5–5.3)
POTASSIUM SERPL-SCNC: 4.1 MMOL/L — SIGNIFICANT CHANGE UP (ref 3.5–5.3)
RBC # BLD: 3.99 M/UL — SIGNIFICANT CHANGE UP (ref 3.8–5.2)
RBC # FLD: 13.6 % — SIGNIFICANT CHANGE UP (ref 10.3–14.5)
SODIUM SERPL-SCNC: 138 MMOL/L — SIGNIFICANT CHANGE UP (ref 135–145)
WBC # BLD: 11.73 K/UL — HIGH (ref 3.8–10.5)
WBC # FLD AUTO: 11.73 K/UL — HIGH (ref 3.8–10.5)

## 2024-03-27 PROCEDURE — 99231 SBSQ HOSP IP/OBS SF/LOW 25: CPT

## 2024-03-27 RX ORDER — TRAMADOL HYDROCHLORIDE 50 MG/1
1 TABLET ORAL
Qty: 42 | Refills: 0
Start: 2024-03-27 | End: 2024-04-02

## 2024-03-27 RX ORDER — TRAMADOL HYDROCHLORIDE 50 MG/1
25 TABLET ORAL
Refills: 0 | Status: DISCONTINUED | OUTPATIENT
Start: 2024-03-27 | End: 2024-03-28

## 2024-03-27 RX ORDER — INFLUENZA VIRUS VACCINE 15; 15; 15; 15 UG/.5ML; UG/.5ML; UG/.5ML; UG/.5ML
0.7 SUSPENSION INTRAMUSCULAR ONCE
Refills: 0 | Status: DISCONTINUED | OUTPATIENT
Start: 2024-03-27 | End: 2024-03-28

## 2024-03-27 RX ORDER — PANTOPRAZOLE SODIUM 20 MG/1
1 TABLET, DELAYED RELEASE ORAL
Qty: 0 | Refills: 0 | DISCHARGE

## 2024-03-27 RX ORDER — OXYCODONE HYDROCHLORIDE 5 MG/1
1 TABLET ORAL
Qty: 42 | Refills: 0
Start: 2024-03-27

## 2024-03-27 RX ORDER — TRAMADOL HYDROCHLORIDE 50 MG/1
50 TABLET ORAL
Refills: 0 | Status: DISCONTINUED | OUTPATIENT
Start: 2024-03-27 | End: 2024-03-28

## 2024-03-27 RX ADMIN — MELOXICAM 15 MILLIGRAM(S): 15 TABLET ORAL at 09:35

## 2024-03-27 RX ADMIN — OXYCODONE HYDROCHLORIDE 5 MILLIGRAM(S): 5 TABLET ORAL at 09:35

## 2024-03-27 RX ADMIN — OXYCODONE HYDROCHLORIDE 5 MILLIGRAM(S): 5 TABLET ORAL at 00:25

## 2024-03-27 RX ADMIN — SIMVASTATIN 10 MILLIGRAM(S): 20 TABLET, FILM COATED ORAL at 21:11

## 2024-03-27 RX ADMIN — OXYCODONE HYDROCHLORIDE 5 MILLIGRAM(S): 5 TABLET ORAL at 08:28

## 2024-03-27 RX ADMIN — Medication 1000 MILLIGRAM(S): at 06:18

## 2024-03-27 RX ADMIN — Medication 1000 MILLIGRAM(S): at 06:16

## 2024-03-27 RX ADMIN — MELOXICAM 15 MILLIGRAM(S): 15 TABLET ORAL at 08:28

## 2024-03-27 RX ADMIN — Medication 1000 MILLIGRAM(S): at 14:18

## 2024-03-27 RX ADMIN — PANTOPRAZOLE SODIUM 40 MILLIGRAM(S): 20 TABLET, DELAYED RELEASE ORAL at 06:16

## 2024-03-27 RX ADMIN — TRAMADOL HYDROCHLORIDE 25 MILLIGRAM(S): 50 TABLET ORAL at 16:56

## 2024-03-27 RX ADMIN — Medication 81 MILLIGRAM(S): at 17:21

## 2024-03-27 RX ADMIN — Medication 101.6 MILLIGRAM(S): at 06:16

## 2024-03-27 RX ADMIN — Medication 1000 MILLIGRAM(S): at 21:13

## 2024-03-27 RX ADMIN — Medication 100 MILLIGRAM(S): at 06:16

## 2024-03-27 RX ADMIN — TRAMADOL HYDROCHLORIDE 25 MILLIGRAM(S): 50 TABLET ORAL at 18:08

## 2024-03-27 RX ADMIN — OXYCODONE HYDROCHLORIDE 5 MILLIGRAM(S): 5 TABLET ORAL at 00:55

## 2024-03-27 RX ADMIN — SENNA PLUS 2 TABLET(S): 8.6 TABLET ORAL at 21:11

## 2024-03-27 RX ADMIN — Medication 1000 MILLIGRAM(S): at 15:15

## 2024-03-27 RX ADMIN — Medication 1000 MILLIGRAM(S): at 21:11

## 2024-03-27 RX ADMIN — Medication 100 MILLIGRAM(S): at 04:00

## 2024-03-27 RX ADMIN — Medication 81 MILLIGRAM(S): at 06:16

## 2024-03-27 NOTE — PHARMACOTHERAPY INTERVENTION NOTE - COMMENTS
Transition of Care video discharge education - medication calendar given to patient  Junito Mckeon PharmD Candidate for Dottie Reina, PharmD 
Admission medication reconciliation POD1

## 2024-03-27 NOTE — DISCHARGE NOTE NURSING/CASE MANAGEMENT/SOCIAL WORK - NSSCNAMETXT_GEN_ALL_CORE
[facility] care agency [phone] will reach out to you within 24-72 hours of your discharge to schedule home care visit/eval appointment with you. Please call agency for any queries regarding home care services

## 2024-03-27 NOTE — CARE COORDINATION ASSESSMENT. - NSPASTMEDSURGHISTORY_GEN_ALL_CORE_FT
PAST MEDICAL & SURGICAL HISTORY:  HLD (hyperlipidemia)      HTN (hypertension)      History of cholecystectomy      History of left hip replacement  2014      Afib      Arthritis of right hip

## 2024-03-27 NOTE — PROGRESS NOTE ADULT - ASSESSMENT
Discharge medication calendar:   ASA EC 81mg q12h x 4 weeks  Meloxicam 15mg QAM x 2-3 weeks   Pantoprazole 40mg QAM x 4 weeks   Narcotic PRN  APAP 1000mg q8h x 2-3 weeks   Docusate 100mg TID while taking narcotic  Miralax, Senna, or Bisacodyl PRN for treatment of constipation

## 2024-03-27 NOTE — PROGRESS NOTE ADULT - ASSESSMENT
77 yo f pmh htn, hld is s/p R THR    #S/P R THR  Post op orders per ortho  Pain management  Bowl regimen  Pt eval  Obtain baseline labs    #HTN  Continue home bp meds with hold parameters  Resume diuretics when discharged home. \par     #HLD  Continue statins    #DVT proph  per ortho    Patient medically optimized for discharge home if cleared by PT and Ortho.

## 2024-03-27 NOTE — OCCUPATIONAL THERAPY INITIAL EVALUATION ADULT - ADDITIONAL COMMENTS
as per pt report: lives in a 2 family home spouse and pt live on 2nd floor daughter lives on 1st floor   +tub Pt owns a Commode and RW from previous sx. PTA pt used a SAC as per pt report: lives in a 2 family home spouse and pt live on 2nd floor daughter lives on 1st floor.  4 stairs +HR to enter and 13 stairs +HR  +tub   Pt owns a Commode and RW from previous sx. PTA pt used a SAC

## 2024-03-27 NOTE — DISCHARGE NOTE NURSING/CASE MANAGEMENT/SOCIAL WORK - PATIENT PORTAL LINK FT
You can access the FollowMyHealth Patient Portal offered by [facility] by registering at the following website: [email or web address]. By joining [facility]’s FollowMyHealth portal, you will also be able to view your health information using other applications (apps) compatible with our system.

## 2024-03-27 NOTE — PATIENT PROFILE ADULT - FUNCTIONAL ASSESSMENT - DAILY ACTIVITY 4.
"[facility]: [place]  NeuroCritical Care Progress Note    Patient Name:  Kimmy Gerardo  MRN:  [id number]    :  1979  Date of Service:  2022  Primary care provider:  No Ref-Primary, Physician      A/P:   Kimmy Gerardo is a 42 year old admitted on 1/15/2022 following a home cardiac arrest 2/2 large saddle pulmonary embolism s/p mechanical thrombectomy.  Initial CT head without acute intracranial pathology.      MRI obtained and is concerning for anoxic brain injury. Will recommend GOC discussion with family and primary team.       NEURO RECS  - Continue vEEG while on sedation  - Avoid hypotonic solutions as they may worsen cerebral edema  - Avoid \"nitroprusside\",\"nitroglycerin\" as they may also worsen cerbral edema.  - Advise slow correction of any high Sodiums if needed  - When safe to do so, limitation of CNS acting medications will permit a more accurate neurological assessment  - We will continue to follow and monitor their EEG and neurologic exam, please call #[phone] with any questions      Physical Examination:   BP (!) 156/94   Pulse 105   Temp (!) 100.8  F (38.2  C)   Resp 30   Ht 1.676 m (5' 6\")   Wt 50.3 kg (110 lb 14.3 oz)   SpO2 96%   BMI 17.90 kg/m    General: Adult female patient, lying in bed, NAD  HEENT: ETT  Cardiac: Tele  Pulm: No SOB, pattern regular, unlabored, expansion symmetric, no retractions or use of accessory muscles, extubate  Abdomen: Soft, bowel sounds present  Extremities: Warm, no edema  Skin: No rash or lesion     Neuro:  Not following commands or speaking. Moves all extremities to noxious stim. Received a few doses of haldol overnight.     I have personally reviewed all labs and imaging for this patient.      Patient discussed with attending neurologist, Dr. Serg Galvan, DNP  Ascom: *[phone]  " 3 = A little assistance

## 2024-03-27 NOTE — CARE COORDINATION ASSESSMENT. - NSDCPLANSERVICES_GEN_ALL_CORE
CM met with patient at bedside.  Patient. A&O x 4. Pt s/p  PROCEDURES: Total right hip replacement.   Pt  resting comfortably / Pt has no complaints at this time.  CM explained role of CM and availability to assist with discharge planning throughout stay.   Provided CM contact information and pt. verbalized understanding. Patient lives in a private house with her  and extended family, 4 steps and 13 steps with HR to navigate. Prior to surgery patient was ind with cane. Patient identified her  as her caregiver at home who will assist her during her recuperation and will transport her home and to MD appointments.   Pt. is agreeable with PT/OT's recommendations for d/c plan to home with HC/PT.  CM explained home care expectations, process, insurance provisions and home safety with good understanding.   Offered list of [facility] and pt. chose [facility] at home care agency.  Provided discharge resources folder. CM made referral accordingly.  Informed them of Anticipated  DC date for today 3/27/2024 and for SOC tomorrow 3/28/2024.   Pt verbalizing understanding and in agreement with d/c plans.  DME: Pt has a rolling walker and cane at home.  PCP: Dr Young [phone]  Pt stated she will be receiving meds to bed from [facility] pharmacy prior to DC./Home Care

## 2024-03-27 NOTE — PATIENT PROFILE ADULT - FALL HARM RISK - HARM RISK INTERVENTIONS
Assistance with ambulation/Assistance OOB with selected safe patient handling equipment/Communicate Risk of Fall with Harm to all staff/Discuss with provider need for PT consult/Monitor gait and stability/Provide patient with walking aids - walker, cane, crutches/Reinforce activity limits and safety measures with patient and family/Sit up slowly, dangle for a short time, stand at bedside before walking/Tailored Fall Risk Interventions/Use of alarms - bed, chair and/or voice tab/Visual Cue: Yellow wristband and red socks/Bed in lowest position, wheels locked, appropriate side rails in place/Call bell, personal items and telephone in reach/Instruct patient to call for assistance before getting out of bed or chair/Non-slip footwear when patient is out of bed/[place] to call system/Physically safe environment - no spills, clutter or unnecessary equipment/Purposeful Proactive Rounding/Room/bathroom lighting operational, light cord in reach

## 2024-03-27 NOTE — PATIENT PROFILE ADULT - NSPROPTRIGHTSUPPORTPERSON_GEN_A_NUR
same name as above Purse String (Intermediate) Text: Given the location of the defect and the characteristics of the surrounding skin a purse string intermediate closure was deemed most appropriate.  Undermining was performed circumfirentially around the surgical defect.  A purse string suture was then placed and tightened.

## 2024-03-27 NOTE — CARE COORDINATION ASSESSMENT. - NSCAREPROVIDERS_GEN_ALL_CORE_FT
CARE PROVIDERS:  Administration: Louisa Cifuentes  Administration: Enzo Banda  Administration: Daniel Mackenzie  Admitting: Kamron Morin  Attending: Kamron Morin  Consultant: Florentino Rodriguez  Consultant: Khoi Sousa  Covering Team: ADÁN Trotter  Nurse: Shania Robbins  Nurse: Jaylene Aggarwal  Nurse: Reanna Lundy  Nurse: Amairani Pascal  Occupational Therapy: Trevon Kenny  Ordered: Physician, Ordering  Override: Tete Burleson  PCA/Nursing Assistant: Dayanara Osullivan  Physical Therapy: Isabelle Beaver  Primary Team: Sally Lyon  Primary Team: Sohail Wahl  Primary Team: Herminio Wood  Primary Team: Leena Cheung  Registered Dietitian: Allyn Feliz  Team: YAIMA MOLINA Hospitalists, Team  UR// Supp. Assoc.: Ana Lucero

## 2024-03-28 VITALS
SYSTOLIC BLOOD PRESSURE: 165 MMHG | HEART RATE: 61 BPM | OXYGEN SATURATION: 95 % | TEMPERATURE: 98 F | RESPIRATION RATE: 18 BRPM | DIASTOLIC BLOOD PRESSURE: 79 MMHG

## 2024-03-28 LAB
ANION GAP SERPL CALC-SCNC: 5 MMOL/L — SIGNIFICANT CHANGE UP (ref 5–17)
BUN SERPL-MCNC: 13 MG/DL — SIGNIFICANT CHANGE UP (ref 7–23)
CALCIUM SERPL-MCNC: 8.8 MG/DL — SIGNIFICANT CHANGE UP (ref 8.4–10.5)
CHLORIDE SERPL-SCNC: 106 MMOL/L — SIGNIFICANT CHANGE UP (ref 96–108)
CO2 SERPL-SCNC: 29 MMOL/L — SIGNIFICANT CHANGE UP (ref 22–31)
CREAT SERPL-MCNC: 0.76 MG/DL — SIGNIFICANT CHANGE UP (ref 0.5–1.3)
EGFR: 81 ML/MIN/1.73M2 — SIGNIFICANT CHANGE UP
GLUCOSE SERPL-MCNC: 112 MG/DL — HIGH (ref 70–99)
HCT VFR BLD CALC: 32.2 % — LOW (ref 34.5–45)
HGB BLD-MCNC: 10.5 G/DL — LOW (ref 11.5–15.5)
MCHC RBC-ENTMCNC: 27.8 PG — SIGNIFICANT CHANGE UP (ref 27–34)
MCHC RBC-ENTMCNC: 32.6 GM/DL — SIGNIFICANT CHANGE UP (ref 32–36)
MCV RBC AUTO: 85.2 FL — SIGNIFICANT CHANGE UP (ref 80–100)
NRBC # BLD: 0 /100 WBCS — SIGNIFICANT CHANGE UP (ref 0–0)
PLATELET # BLD AUTO: 194 K/UL — SIGNIFICANT CHANGE UP (ref 150–400)
POTASSIUM SERPL-MCNC: 4.1 MMOL/L — SIGNIFICANT CHANGE UP (ref 3.5–5.3)
POTASSIUM SERPL-SCNC: 4.1 MMOL/L — SIGNIFICANT CHANGE UP (ref 3.5–5.3)
RBC # BLD: 3.78 M/UL — LOW (ref 3.8–5.2)
RBC # FLD: 14 % — SIGNIFICANT CHANGE UP (ref 10.3–14.5)
SODIUM SERPL-SCNC: 140 MMOL/L — SIGNIFICANT CHANGE UP (ref 135–145)
WBC # BLD: 12.24 K/UL — HIGH (ref 3.8–10.5)
WBC # FLD AUTO: 12.24 K/UL — HIGH (ref 3.8–10.5)

## 2024-03-28 PROCEDURE — 97535 SELF CARE MNGMENT TRAINING: CPT

## 2024-03-28 PROCEDURE — 86900 BLOOD TYPING SEROLOGIC ABO: CPT

## 2024-03-28 PROCEDURE — C1776: CPT

## 2024-03-28 PROCEDURE — 85027 COMPLETE CBC AUTOMATED: CPT

## 2024-03-28 PROCEDURE — 97530 THERAPEUTIC ACTIVITIES: CPT

## 2024-03-28 PROCEDURE — C1713: CPT

## 2024-03-28 PROCEDURE — 36415 COLL VENOUS BLD VENIPUNCTURE: CPT

## 2024-03-28 PROCEDURE — 80048 BASIC METABOLIC PNL TOTAL CA: CPT

## 2024-03-28 PROCEDURE — 86901 BLOOD TYPING SEROLOGIC RH(D): CPT

## 2024-03-28 PROCEDURE — 97110 THERAPEUTIC EXERCISES: CPT

## 2024-03-28 PROCEDURE — C1889: CPT

## 2024-03-28 PROCEDURE — 97161 PT EVAL LOW COMPLEX 20 MIN: CPT

## 2024-03-28 PROCEDURE — 97165 OT EVAL LOW COMPLEX 30 MIN: CPT

## 2024-03-28 PROCEDURE — 76000 FLUOROSCOPY <1 HR PHYS/QHP: CPT

## 2024-03-28 PROCEDURE — 86850 RBC ANTIBODY SCREEN: CPT

## 2024-03-28 PROCEDURE — 97116 GAIT TRAINING THERAPY: CPT

## 2024-03-28 PROCEDURE — 99231 SBSQ HOSP IP/OBS SF/LOW 25: CPT

## 2024-03-28 RX ADMIN — TRAMADOL HYDROCHLORIDE 25 MILLIGRAM(S): 50 TABLET ORAL at 08:43

## 2024-03-28 RX ADMIN — PANTOPRAZOLE SODIUM 40 MILLIGRAM(S): 20 TABLET, DELAYED RELEASE ORAL at 05:29

## 2024-03-28 RX ADMIN — TRAMADOL HYDROCHLORIDE 25 MILLIGRAM(S): 50 TABLET ORAL at 09:30

## 2024-03-28 RX ADMIN — MELOXICAM 15 MILLIGRAM(S): 15 TABLET ORAL at 08:44

## 2024-03-28 RX ADMIN — Medication 100 MILLIGRAM(S): at 05:29

## 2024-03-28 RX ADMIN — MELOXICAM 15 MILLIGRAM(S): 15 TABLET ORAL at 08:45

## 2024-03-28 RX ADMIN — Medication 1000 MILLIGRAM(S): at 05:33

## 2024-03-28 RX ADMIN — Medication 1000 MILLIGRAM(S): at 14:26

## 2024-03-28 RX ADMIN — Medication 1000 MILLIGRAM(S): at 05:29

## 2024-03-28 RX ADMIN — Medication 81 MILLIGRAM(S): at 05:29

## 2024-03-28 NOTE — PROGRESS NOTE ADULT - PROVIDER SPECIALTY LIST ADULT
Orthopedics
Hospitalist
Hospitalist
Orthopedics
Orthopedics
Pharmacy
Orthopedics
4 person assist/set-up required

## 2024-03-28 NOTE — PROGRESS NOTE ADULT - REASON FOR ADMISSION
right hip pain--for right ant ALONSO

## 2024-03-28 NOTE — PROGRESS NOTE ADULT - SUBJECTIVE AND OBJECTIVE BOX
Procedure: Right Anterior THR  POD#: 1    S: Pt without complaints. No SOB,CP, N/V. Tolerated Fluids / Diet well.   Pain comfortable tylenol and meloxicam.  oxy 5 used once. No BM yet  + flatus, No abdominal pain.  +void  Pain Rx:   acetaminophen     Tablet .. 1000 milliGRAM(s) Oral every 8 hours  HYDROmorphone  Injectable 0.5 milliGRAM(s) IV Push every 3 hours PRN  meloxicam 15 milliGRAM(s) Oral once  meloxicam 15 milliGRAM(s) Oral daily  ondansetron Injectable 4 milliGRAM(s) IV Push every 6 hours PRN  oxyCODONE    IR 5 milliGRAM(s) Oral every 3 hours PRN  oxyCODONE    IR 10 milliGRAM(s) Oral every 3 hours PRN    O: General: Pt Alert and oriented, On exam NAD,   VS: Vital Signs Last 24 Hrs  T(C): 36.5 (27 Mar 2024 07:34), Max: 36.7 (26 Mar 2024 17:07)  T(F): 97.7 (27 Mar 2024 07:34), Max: 98 (26 Mar 2024 17:07)  HR: 61 (27 Mar 2024 07:34) (61 - 82)  BP: 131/73 (27 Mar 2024 07:34) (96/44 - 156/75)  BP(mean): --  RR: 18 (27 Mar 2024 07:34) (14 - 20)  SpO2: 96% (27 Mar 2024 07:34) (95% - 99%)    Parameters below as of 27 Mar 2024 07:34  Patient On (Oxygen Delivery Method): room air      Heart: RRR  Lungs: BS clear bilat.  Abdomen: Soft; no distention, benign exam    right hip silverlon clean and dry.  hemovac removed.  Neurologic: Has sensation bilat. feet & toes ;  Full AROM bilat feet & toes. EHL / AT  = Bilat: 5/5 ; Sensation Presentmid lateral thigh  Vascular: Feet toes warm, pink. DP = 2+. Calves soft ; w/o tenderness bilat..  VTEP: On Bilat. Venodynes +   aspirin enteric coated 81 milliGRAM(s) Oral every 12 hours    H.O Prophylaxis: preop RT  Activity in PT : unable last night due to spinal              Hospitalist input noted    Primary Orthopedic Assessment:  • Stable from Orthopedic perspective  • Neuro motor exam stable:   • Labs: pending      Plan:   • Continue:  PT/OT/Weightbearing as tolerated with assistance of a walker/Anterior THR precautions/Ice to hip/          Incentive spirometry encouraged   • Continue DVT prophylaxis as prescribed, including use of compression devices and ankle pumps  • Continue Pain Rx  • Anterior hip precautions reviewed with patient  • Plans per Medicine   • Discharge planning – anticipated discharge is Home D/C with home care & home PTwhen medically stable & cleared by PT/OT--today  Opioid safety discussed w/ pt.  Do not keep opioid in the medicine cabinet in bathroom or on kitchen counter where others may have access to it.  Do not drive while taking opioid.  To dispose of it some pharmacies do have drop boxes as do police stations.  Do not flush or put in trash.      
Orthopaedic Post Op Note    Procedure: Right Anterior THR  Surgeon: Patience    78y Female comfortable, without complaints. Up to side of bed with PT. Tolerating diet. Patient has not voided yet but feels like she will need to soon.   Reported pain score = 0/10 at this time.  Discussed multi-modal pain regimen with patient who expressed full understanding.   Denies N/V, CP, SOB, numbness/tingling of extremities.    PE:  Vital Signs Last 24 Hrs  T(C): 36.7 (26 Mar 2024 17:07), Max: 36.7 (26 Mar 2024 17:07)  T(F): 98 (26 Mar 2024 17:07), Max: 98 (26 Mar 2024 17:07)  HR: 72 (26 Mar 2024 17:07) (66 - 82)  BP: 117/59 (26 Mar 2024 17:07) (96/44 - 156/75)  RR: 20 (26 Mar 2024 17:07) (14 - 20)  SpO2: 97% (26 Mar 2024 17:07) (97% - 99%)    Parameters below as of 26 Mar 2024 17:07  Patient On (Oxygen Delivery Method): room air      General: Pt alert and oriented, NAD  Abd: soft, NT, ND  Right Hip Silverlon Dressing: C/D/I with no erythema or discharge noted, +HV in place, on suction, and draining serosanguineous fluid and drain site covered by Silverlon dressing.   Bilateral LEs:  Motor:   5/5 dorsiflexion, plantarflexion, EHL  Sensation intact to LT  + DP Pulses  SCDs in place    A/P: 78y Female stable POD#0 s/p Right Anterior THR   -  Acetaminophen, Meloxicam, Oxycodone, Dilaudid  for Pain Control   - DVT ppx:   Aspirin 81mg q 12 h    and ambulation as tolerated  - Erika op IV abx: Ancef  - Pre-op Radiation Treatment for HO ppx  - Incentive Spirometry  - Anterior total hip precautions  - PT, OT per protocol, WBAT  - Medicine Comanagement  - F/U AM Labs  DCP = home tomorrow pending PT, OT, medical clearance       
Procedure: Right Anterior THR  POD#: 1    S: Pt without complaints. No SOB,CP, N/V. Tolerated Fluids / Diet well.   Pain comfortable on tylenol and meloxicam.  Oxy apparently caused dizziness late yesterday morning and was switched to tramadol which is better tolerated. No BM yet  + flatus, No abdominal pain.  Voiding.  Pain Rx:   acetaminophen     Tablet .. 1000 milliGRAM(s) Oral every 8 hours  HYDROmorphone  Injectable 0.5 milliGRAM(s) IV Push every 3 hours PRN  meloxicam 15 milliGRAM(s) Oral once  meloxicam 15 milliGRAM(s) Oral daily  ondansetron Injectable 4 milliGRAM(s) IV Push every 6 hours PRN  traMADol 50 milliGRAM(s) Oral every 3 hours PRN  traMADol 25 milliGRAM(s) Oral every 3 hours PRN    O: General: Pt Alert and oriented, On exam NAD,   VS: Vital Signs Last 24 Hrs  T(C): 36.4 (27 Mar 2024 23:30), Max: 36.5 (27 Mar 2024 07:34)  T(F): 97.6 (27 Mar 2024 23:30), Max: 97.7 (27 Mar 2024 07:34)  HR: 62 (28 Mar 2024 05:26) (58 - 71)  BP: 128/72 (28 Mar 2024 05:26) (104/66 - 152/82)  BP(mean): --  RR: 16 (28 Mar 2024 05:26) (16 - 18)  SpO2: 96% (28 Mar 2024 05:26) (95% - 96%)    Parameters below as of 28 Mar 2024 05:26  Patient On (Oxygen Delivery Method): room air      Heart: RRR  Lungs: BS clear bilat.  Abdomen: Soft; no distention, benign exam    Ext: Right ant hip silverlon clean and in place  Neurologic: Has sensation bilat. feet & toes ;  Full AROM bilat feet & toes. EHL / AT  = Bilat: 5/5 ; Sensation Present mid lateral thigh  Vascular: Feet toes warm, pink. DP = 2+. Calves soft ; w/o tenderness bilat..  VTEP: On Bilat. Venodynes +   aspirin enteric coated 81 milliGRAM(s) Oral every 12 hours    H.O Prophylaxis: preop RT  Activity in PT : Walked 150' and did stairs, but became dizzy.                          11.2   11.73 )-----------( 186      ( 27 Mar 2024 06:30 )             34.2     03-27    138  |  103  |  10  ----------------------------<  153<H>  4.1   |  26  |  0.65    Ca    8.6      27 Mar 2024 06:30        Hospitalist input noted    Primary Orthopedic Assessment:  • Stable from Orthopedic perspective  • Neuro motor exam stable:   • Labs: stable.  today's labs pending      Plan:   • Continue:  PT/OT/Weightbearing as tolerated with assistance of a walker/Anterior THR precautions/Ice to hip/          Incentive spirometry encouraged   • Continue DVT prophylaxis as prescribed, including use of compression devices and ankle pumps  • Continue Pain Rx  • Anterior hip precautions reviewed with patient  • Plans per Medicine   • Discharge planning – anticipated discharge is Home D/C with home care & home PT when medically stable & cleared by PT/OT--today    
Pt seen in preop holding for med review.  On exam DP pulses 1+ bilat, sensation equal and intact, motor 5/5 bilat.
Subjective: Patient seen and examined.  Tolerated the Tramadol better with no more light headedness.     MEDICATIONS  (STANDING):  acetaminophen     Tablet .. 1000 milliGRAM(s) Oral every 8 hours  aspirin enteric coated 81 milliGRAM(s) Oral every 12 hours  influenza  Vaccine (HIGH DOSE) 0.7 milliLiter(s) IntraMuscular once  lactated ringers. 1000 milliLiter(s) (125 mL/Hr) IV Continuous <Continuous>  meloxicam 15 milliGRAM(s) Oral daily  meloxicam 15 milliGRAM(s) Oral once  metoprolol succinate  milliGRAM(s) Oral daily  pantoprazole    Tablet 40 milliGRAM(s) Oral before breakfast  polyethylene glycol 3350 17 Gram(s) Oral at bedtime  senna 2 Tablet(s) Oral at bedtime  simvastatin 10 milliGRAM(s) Oral at bedtime    MEDICATIONS  (PRN):  aluminum hydroxide/magnesium hydroxide/simethicone Suspension 30 milliLiter(s) Oral four times a day PRN Indigestion  bisacodyl Suppository 10 milliGRAM(s) Rectal once PRN Constipation  HYDROmorphone  Injectable 0.5 milliGRAM(s) IV Push every 3 hours PRN Breakthrough pain  magnesium hydroxide Suspension 30 milliLiter(s) Oral daily PRN Constipation  ondansetron Injectable 4 milliGRAM(s) IV Push every 6 hours PRN Nausea and/or Vomiting  traMADol 25 milliGRAM(s) Oral every 3 hours PRN Moderate Pain (4 - 6)  traMADol 50 milliGRAM(s) Oral every 3 hours PRN Severe Pain (7 - 10)      Allergies    sulfa drugs (Hives)    Intolerances        Vital Signs Last 24 Hrs  T(C): 36.7 (28 Mar 2024 07:41), Max: 36.7 (28 Mar 2024 07:41)  T(F): 98 (28 Mar 2024 07:41), Max: 98 (28 Mar 2024 07:41)  HR: 61 (28 Mar 2024 07:41) (58 - 66)  BP: 165/79 (28 Mar 2024 07:41) (128/72 - 165/79)  BP(mean): --  RR: 18 (28 Mar 2024 07:41) (16 - 18)  SpO2: 95% (28 Mar 2024 07:41) (95% - 96%)    Parameters below as of 28 Mar 2024 07:41  Patient On (Oxygen Delivery Method): room air        PHYSICAL EXAM:  GENERAL: NAD, well-groomed, well-developed  HEAD:  Atraumatic, Normocephalic  ENMT: Moist mucous membranes,   NECK: Supple, No JVD, Normal thyroid  NERVOUS SYSTEM:  All 4 extremities mobile, no gross sensory deficits.   CHEST/LUNG: Clear to auscultation bilaterally; No rales, rhonchi, wheezing, or rubs  HEART: Regular rate and rhythm; No murmurs, rubs, or gallops  ABDOMEN: Soft, Nontender, Nondistended; Bowel sounds present  EXTREMITIES:  2+ Peripheral Pulses, No clubbing, cyanosis, or edema      LABS:                        10.5   12.24 )-----------( 194      ( 28 Mar 2024 06:00 )             32.2     28 Mar 2024 06:00    140    |  106    |  13     ----------------------------<  112    4.1     |  29     |  0.76     Ca    8.8        28 Mar 2024 06:00        Urinalysis Basic - ( 28 Mar 2024 06:00 )    Color: x / Appearance: x / SG: x / pH: x  Gluc: 112 mg/dL / Ketone: x  / Bili: x / Urobili: x   Blood: x / Protein: x / Nitrite: x   Leuk Esterase: x / RBC: x / WBC x   Sq Epi: x / Non Sq Epi: x / Bacteria: x      CAPILLARY BLOOD GLUCOSE          RADIOLOGY & ADDITIONAL TESTS:    Imaging Personally Reviewed:  [ ] YES     Consultant(s) Notes Reviewed:      Care Discussed with Consultants/Other Providers:    Advanced Directives: [ ] DNR  [ ] No feeding tube  [ ] MOLST in chart  [ ] MOLST completed today  [ ] Unknown  
Subjective: Patient seen and examined. No overnight events. Doing well.      MEDICATIONS  (STANDING):  acetaminophen     Tablet .. 1000 milliGRAM(s) Oral every 8 hours  aspirin enteric coated 81 milliGRAM(s) Oral every 12 hours  lactated ringers. 1000 milliLiter(s) (125 mL/Hr) IV Continuous <Continuous>  meloxicam 15 milliGRAM(s) Oral once  meloxicam 15 milliGRAM(s) Oral daily  metoprolol succinate  milliGRAM(s) Oral daily  pantoprazole    Tablet 40 milliGRAM(s) Oral before breakfast  polyethylene glycol 3350 17 Gram(s) Oral at bedtime  senna 2 Tablet(s) Oral at bedtime  simvastatin 10 milliGRAM(s) Oral at bedtime    MEDICATIONS  (PRN):  aluminum hydroxide/magnesium hydroxide/simethicone Suspension 30 milliLiter(s) Oral four times a day PRN Indigestion  HYDROmorphone  Injectable 0.5 milliGRAM(s) IV Push every 3 hours PRN Breakthrough pain  magnesium hydroxide Suspension 30 milliLiter(s) Oral daily PRN Constipation  ondansetron Injectable 4 milliGRAM(s) IV Push every 6 hours PRN Nausea and/or Vomiting  oxyCODONE    IR 5 milliGRAM(s) Oral every 3 hours PRN Moderate Pain (4 - 6)  oxyCODONE    IR 10 milliGRAM(s) Oral every 3 hours PRN Severe Pain (7 - 10)      Allergies    sulfa drugs (Hives)    Intolerances        Vital Signs Last 24 Hrs  T(C): 36.5 (27 Mar 2024 07:34), Max: 36.7 (26 Mar 2024 17:07)  T(F): 97.7 (27 Mar 2024 07:34), Max: 98 (26 Mar 2024 17:07)  HR: 71 (27 Mar 2024 09:33) (61 - 82)  BP: 150/75 (27 Mar 2024 09:33) (96/44 - 150/75)  BP(mean): --  RR: 18 (27 Mar 2024 07:34) (14 - 20)  SpO2: 96% (27 Mar 2024 07:34) (95% - 99%)    Parameters below as of 27 Mar 2024 07:34  Patient On (Oxygen Delivery Method): room air        PHYSICAL EXAM:  GENERAL: NAD, well-groomed, well-developed  HEAD:  Atraumatic, Normocephalic  ENMT: Moist mucous membranes,   NECK: Supple, No JVD, Normal thyroid  NERVOUS SYSTEM:  All 4 extremities mobile, no gross sensory deficits.   CHEST/LUNG: Clear to auscultation bilaterally; No rales, rhonchi, wheezing, or rubs  HEART: Regular rate and rhythm; No murmurs, rubs, or gallops  ABDOMEN: Soft, Nontender, Nondistended; Bowel sounds present  EXTREMITIES:  2+ Peripheral Pulses, No clubbing, cyanosis, or edema      LABS:                        11.2   11.73 )-----------( 186      ( 27 Mar 2024 06:30 )             34.2     27 Mar 2024 06:30    138    |  103    |  10     ----------------------------<  153    4.1     |  26     |  0.65     Ca    8.6        27 Mar 2024 06:30        Urinalysis Basic - ( 27 Mar 2024 06:30 )    Color: x / Appearance: x / SG: x / pH: x  Gluc: 153 mg/dL / Ketone: x  / Bili: x / Urobili: x   Blood: x / Protein: x / Nitrite: x   Leuk Esterase: x / RBC: x / WBC x   Sq Epi: x / Non Sq Epi: x / Bacteria: x      CAPILLARY BLOOD GLUCOSE          RADIOLOGY & ADDITIONAL TESTS:    Imaging Personally Reviewed:  [ ] YES     Consultant(s) Notes Reviewed:      Care Discussed with Consultants/Other Providers:    Advanced Directives: [ ] DNR  [ ] No feeding tube  [ ] MOLST in chart  [ ] MOLST completed today  [ ] Unknown

## 2024-03-29 PROBLEM — Z86.79 HISTORY OF ATRIAL FIBRILLATION: Status: RESOLVED | Noted: 2024-03-29 | Resolved: 2024-03-29

## 2024-03-29 PROBLEM — I10 HYPERTENSION: Status: RESOLVED | Noted: 2024-03-29 | Resolved: 2024-03-29

## 2024-03-29 PROBLEM — E78.5 HYPERLIPIDEMIA: Status: RESOLVED | Noted: 2024-03-29 | Resolved: 2024-03-29

## 2024-03-29 PROBLEM — M16.11 ARTHRITIS OF RIGHT HIP: Status: ACTIVE | Noted: 2024-01-12

## 2024-03-29 NOTE — OB/GYN HISTORY
[Definite:  ___ (Date)] : the last menstrual period was [unfilled] [Currently In Menopause] : currently in menopause [Experiencing Menopausal Sxs] : not experiencing menopausal symptoms

## 2024-03-29 NOTE — REASON FOR VISIT
[Consideration of Therapy for Benign Disease] : consideration of therapy for benign disease [Home] : at home, [unfilled] , at the time of the visit. [Patient] : the patient [Self] : self [Consideration of Curative Therapy] : consideration of curative therapy for [Other: ___] : [unfilled] [Medical Office: ([facility])___] : at the medical office located in  [FreeTextEntry4] : Robyn Leon RN

## 2024-03-29 NOTE — REASON FOR VISIT
[Consideration of Therapy for Benign Disease] : consideration of therapy for benign disease [Home] : at home, [unfilled] , at the time of the visit. [Patient] : the patient [Self] : self [Consideration of Curative Therapy] : consideration of curative therapy for [Other: ___] : [unfilled] [Medical Office: ([facility])___] : at the medical office located in  [FreeTextEntry4] : Robyn Leon RN

## 2024-03-29 NOTE — HISTORY OF PRESENT ILLNESS
[FreeTextEntry1] : Ms. Cates is a 79 yo female with progressively increasing right hip osteoarthritis and right hip pain, and is scheduled for hip replacement surgery on 3/19/.  She is for consultation for pre-operative RT to the right hip, for purpose of prevention of heterotropic bone ossification.  Patient reports pain for years intermittently. Patient complains of chronic right buttock pain that occasionally radiates down the right lower extremity. Pain is provoked by weight bearing. She has a history of left hip replacement 10 years ago. Per orthopedic notes, X-rays of the AP pelvis and AP, lateral of the RIGHT hip on 01/12/2024 demonstrates supralateral joint space narrowing focal bone on bone opposition and left cementless total hip satisfactory total component alignment.

## 2024-04-10 ENCOUNTER — APPOINTMENT (OUTPATIENT)
Dept: ORTHOPEDIC SURGERY | Facility: CLINIC | Age: 79
End: 2024-04-10
Payer: MEDICARE

## 2024-04-10 VITALS — BODY MASS INDEX: 34.04 KG/M2 | HEIGHT: 62 IN | WEIGHT: 185 LBS

## 2024-04-10 DIAGNOSIS — Z96.642 PRESENCE OF LEFT ARTIFICIAL HIP JOINT: ICD-10-CM

## 2024-04-10 PROCEDURE — 99024 POSTOP FOLLOW-UP VISIT: CPT

## 2024-04-10 PROCEDURE — 73502 X-RAY EXAM HIP UNI 2-3 VIEWS: CPT

## 2024-04-10 NOTE — ADDENDUM
[FreeTextEntry1] : I, Ciera Trotter, acted solely as a scribe for Dr. Wesley Morin MD on this date  04/10/2024  All medical record entries made by the Scribe were at my, Dr. Wesley Morin MD , direction and personally dictated by me on 04/10/2024. I have reviewed the chart and agree that the record accurately reflects my personal performance of the history, physical exam, assessment and plan. I have also personally directed, reviewed, and agreed with the chart.

## 2024-04-10 NOTE — HISTORY OF PRESENT ILLNESS
[de-identified] : S/p right total hip replacement on 03/27/2024 [de-identified] : 78 year old female presents for evaluation s/p S/p right total hip replacement on 03/27/2024. Significant improvement in preoperative pain. Denies fever, chills, groin pain, thigh pain. The patient does describe some residual numbness at operative site. Slight soreness near incision site when ambulating. Ambulating with a rolling walker at this time. She notes swelling of the R LE. [de-identified] : Constitutional: Well nourished, no distress. No neurovascular symptoms.  o Right Hip Exam: Incision healed, clean dry, and in tact. Satisfactory gait. Leg lengths equal.  ROM: satisfactory pain free. Ligaments intact. resisted hip flexion 5/5, resisted hip abduction 5/5. Diffuse swelling right leg, calf nontender. [de-identified] : X-rays of the AP pelvis and AP, lateral of the RIGHT hip obtained today 04/10/2024 demonstrates total hip replacement components in good alignment, femoral heads well centered in acetabulum. [de-identified] : S/p right total hip replacement on 03/27/2024 [de-identified] : Wound care instructions provided today. A script for a Doppler of the RLE was provided today to RO DVT. They have been instructed on exercises to avoid, including running, jumping, squatting, lunging.  Discontinue PT until Doppler. Follow up 1 month

## 2024-04-11 ENCOUNTER — OUTPATIENT (OUTPATIENT)
Dept: OUTPATIENT SERVICES | Facility: HOSPITAL | Age: 79
LOS: 1 days | End: 2024-04-11
Payer: MEDICARE

## 2024-04-11 ENCOUNTER — TRANSCRIPTION ENCOUNTER (OUTPATIENT)
Age: 79
End: 2024-04-11

## 2024-04-11 ENCOUNTER — APPOINTMENT (OUTPATIENT)
Dept: ULTRASOUND IMAGING | Facility: CLINIC | Age: 79
End: 2024-04-11
Payer: MEDICARE

## 2024-04-11 DIAGNOSIS — M16.11 UNILATERAL PRIMARY OSTEOARTHRITIS, RIGHT HIP: ICD-10-CM

## 2024-04-11 DIAGNOSIS — Z90.49 ACQUIRED ABSENCE OF OTHER SPECIFIED PARTS OF DIGESTIVE TRACT: Chronic | ICD-10-CM

## 2024-04-11 PROCEDURE — 93970 EXTREMITY STUDY: CPT | Mod: 26

## 2024-04-11 PROCEDURE — 93970 EXTREMITY STUDY: CPT

## 2024-05-08 ENCOUNTER — APPOINTMENT (OUTPATIENT)
Dept: ORTHOPEDIC SURGERY | Facility: CLINIC | Age: 79
End: 2024-05-08
Payer: MEDICARE

## 2024-05-08 VITALS — WEIGHT: 176 LBS | HEIGHT: 62 IN | BODY MASS INDEX: 32.39 KG/M2

## 2024-05-08 PROCEDURE — 99024 POSTOP FOLLOW-UP VISIT: CPT

## 2024-05-08 NOTE — ADDENDUM
[FreeTextEntry1] : I, Ciera Trotter, acted solely as a scribe for Dr. Wesley Morin MD on this date  05/08/2024  All medical record entries made by the Scribe were at my, Dr. Wesley Morin MD , direction and personally dictated by me on 05/08/2024. I have reviewed the chart and agree that the record accurately reflects my personal performance of the history, physical exam, assessment and plan. I have also personally directed, reviewed, and agreed with the chart.

## 2024-05-08 NOTE — HISTORY OF PRESENT ILLNESS
[de-identified] : S/p right total hip replacement on 03/27/2024 [de-identified] : 78 year old female presents for evaluation s/p S/p right total hip replacement on 03/27/2024. Significant improvement in preoperative pain. Denies fever, chills, groin pain, thigh pain. The patient does describe some residual numbness at operative site. Slight soreness near incision site when ambulating. Ambulating with a cane at this time. Overall, she is doing well.  She's been attending PT noting improvements in strength and ROM. She presents with Doppler results Denies any pain of the hip, Reports intermittent pain posterior right thigh that is worse with walking. Pain is about a 4/10.  Of note, PMHx A-Fib.  [de-identified] : Constitutional: Well nourished, no distress. No neurovascular symptoms.  o Right Hip Exam: Incision healed, clean dry, and in tact. Satisfactory gait. Leg lengths equal.  ROM: satisfactory pain free. Ligaments intact. resisted hip flexion 5/5, resisted hip abduction 5/5.  [de-identified] : X-rays of the AP pelvis and AP, lateral of the RIGHT  hip obtained today 05/08/2024 demonstrates total hip replacement components in good alignment, femoral heads well centered in acetabulum.  EXAM: [id number] - US DPLX LWR EXT VEINS COMPL BI  - ORDERED BY:  CORINA BOYD  PROCEDURE DATE:  04/11/2024  INTERPRETATION:  CLINICAL INFORMATION: Recent total hip replacement. Leg swelling. Evaluate for DVT.  COMPARISON: None available.  TECHNIQUE: Duplex sonography of the BILATERAL LOWER extremity veins with color and spectral Doppler, with and without compression.  FINDINGS:  RIGHT: Normal compressibility of the RIGHT common femoral, femoral and popliteal veins. Doppler examination shows normal spontaneous and phasic flow. No RIGHT calf vein thrombosis is detected.  LEFT: Normal compressibility of the LEFT common femoral, femoral and popliteal veins. Doppler examination shows normal spontaneous and phasic flow. No LEFT calf vein thrombosis is detected.  IMPRESSION: No evidence of deep venous thrombosis in either lower extremity.  --- End of Report ---       DRISS ANTOINE MD; Attending Radiologist This document has been electronically signed. Apr 11 2024  6:59PM  X-rays of the AP pelvis and AP, lateral of the RIGHT hip obtained today 04/10/2024 demonstrates total hip replacement components in good alignment, femoral heads well centered in acetabulum. [de-identified] : S/p right total hip replacement on 03/27/2024 [de-identified] : They have been instructed on exercises to avoid, including running, jumping, squatting, lunging.  PT. Follow up 1 month

## 2024-06-12 ENCOUNTER — APPOINTMENT (OUTPATIENT)
Dept: ORTHOPEDIC SURGERY | Facility: CLINIC | Age: 79
End: 2024-06-12
Payer: MEDICARE

## 2024-06-12 VITALS — HEIGHT: 62 IN | WEIGHT: 178 LBS | BODY MASS INDEX: 32.76 KG/M2

## 2024-06-12 DIAGNOSIS — Z96.641 PRESENCE OF RIGHT ARTIFICIAL HIP JOINT: ICD-10-CM

## 2024-06-12 PROCEDURE — 99024 POSTOP FOLLOW-UP VISIT: CPT

## 2024-06-12 NOTE — HISTORY OF PRESENT ILLNESS
[de-identified] : S/p right total hip replacement on 03/27/2024 [de-identified] : 78 year old female presents for evaluation s/p S/p right total hip replacement on 03/27/2024. Significant improvement in preoperative pain. Denies fever, chills, groin pain, thigh pain. The patient does describe some residual numbness at operative site. Slight soreness near incision site when ambulating. Ambulating independently. Overall, she is doing well.  She's been attending PT noting improvements in strength and ROM. She presents with Doppler results Denies any pain of the hip, Reports intermittent mild groin pain that is worse with walking.  Of note, PMHx A-Fib.  [de-identified] : Constitutional: Well nourished, no distress. No neurovascular symptoms.  o Right Hip Exam: Incision healed, clean dry, and in tact. Satisfactory gait. Leg lengths equal.  ROM: satisfactory pain free. Ligaments intact. resisted hip flexion 5/5 with no pain, resisted hip abduction 5/5. flexion 95 internal 20 external 35 abduction 45 adduction 45 with mild groin "ache" [de-identified] : X-rays of the AP pelvis and AP, lateral of the RIGHT  hip obtained today 05/08/2024 demonstrates total hip replacement components in good alignment, femoral heads well centered in acetabulum.  EXAM: [id number] - US DPLX LWR EXT VEINS COMPL BI  - ORDERED BY:  CORINA BOYD  PROCEDURE DATE:  04/11/2024  INTERPRETATION:  CLINICAL INFORMATION: Recent total hip replacement. Leg swelling. Evaluate for DVT.  COMPARISON: None available.  TECHNIQUE: Duplex sonography of the BILATERAL LOWER extremity veins with color and spectral Doppler, with and without compression.  FINDINGS:  RIGHT: Normal compressibility of the RIGHT common femoral, femoral and popliteal veins. Doppler examination shows normal spontaneous and phasic flow. No RIGHT calf vein thrombosis is detected.  LEFT: Normal compressibility of the LEFT common femoral, femoral and popliteal veins. Doppler examination shows normal spontaneous and phasic flow. No LEFT calf vein thrombosis is detected.  IMPRESSION: No evidence of deep venous thrombosis in either lower extremity.  --- End of Report ---       DRISS ANTOINE MD; Attending Radiologist This document has been electronically signed. Apr 11 2024  6:59PM  X-rays of the AP pelvis and AP, lateral of the RIGHT hip obtained today 04/10/2024 demonstrates total hip replacement components in good alignment, femoral heads well centered in acetabulum. [de-identified] : S/p right total hip replacement on 03/27/2024 [de-identified] : They have been instructed on exercises to avoid, including running, jumping, squatting, lunging.  PT. Follow up 1 month

## 2024-06-12 NOTE — ADDENDUM
[FreeTextEntry1] : I, Ciera Trotter, acted solely as a scribe for Dr. Wesley Morin MD on this date  06/12/2024  All medical record entries made by the Scribe were at my, Dr. Wesley Morin MD , direction and personally dictated by me on 06/12/2024. I have reviewed the chart and agree that the record accurately reflects my personal performance of the history, physical exam, assessment and plan. I have also personally directed, reviewed, and agreed with the chart.

## 2024-09-11 ENCOUNTER — APPOINTMENT (OUTPATIENT)
Dept: ORTHOPEDIC SURGERY | Facility: CLINIC | Age: 79
End: 2024-09-11

## 2024-09-26 ENCOUNTER — NON-APPOINTMENT (OUTPATIENT)
Age: 79
End: 2024-09-26
